# Patient Record
Sex: FEMALE | Race: WHITE | Employment: UNEMPLOYED | URBAN - NONMETROPOLITAN AREA
[De-identification: names, ages, dates, MRNs, and addresses within clinical notes are randomized per-mention and may not be internally consistent; named-entity substitution may affect disease eponyms.]

---

## 2024-01-05 ENCOUNTER — HOSPITAL ENCOUNTER (OUTPATIENT)
Dept: OCCUPATIONAL THERAPY | Age: 24
Setting detail: THERAPIES SERIES
Discharge: HOME | End: 2024-01-05
Payer: MEDICARE

## 2024-01-05 PROCEDURE — 97166 OT EVAL MOD COMPLEX 45 MIN: CPT

## 2024-01-05 PROCEDURE — 97530 THERAPEUTIC ACTIVITIES: CPT

## 2024-01-05 PROCEDURE — 97110 THERAPEUTIC EXERCISES: CPT

## 2024-01-05 NOTE — PLAN OF CARE
Phone: [phone]                      [facility]           Fax: [phone]                           Outpatient Occupational Therapy                                                                            Initial Evaluation      Name:  Lia NATION: ***  Date: 2024  Referring Physician: No ref. provider found   Medical    Rehab Diagnosis/ICD-10#s:***  Insurance: OT Insurance Information: [place] Medicaid  Total # of Visits to Date: 1  Next Dr. Appointment:  Chief Complaint:  CSN #: [id number]    Onset of Injury/Condition:      Mechanism of Injury:    Surgery Date:    Precautions:   []None  [] Fall Risk  []WB Status  [] Pacemaker   []Other:            Involved Extremity:      [] Left [] Right  Dominant: [] Left []Right    Work Status:   [] Normal [] Restricted [] Off D/T Injury/Condition [] Retired [] Unemployed [] Disabled []Other:  Critical Job/Daily Tasks:     Orthosis:     [] Currently has  [] To be custom fabricated  []Planned for subsequent visit  Type:      Subjective:      Pain: Intensity:   /10 Location:     Pain Type: [] Constant [] Intermittent   [  ] with pain meds at rest   [] With movement/Resistive activity [] With Sedentary activity      Objective:    Functional Impairment:  [] Mobility:    [] Current [] Goal [] Discharge  [] Carry: [] Current [] Goal [] Discharge  [] Body Position:  [] Current [] Goal [] Discharge  [] Self Care:    [] Current [] Goal [] Discharge  [] Other:    [] Current [] Goal [] Discharge    Functional Impairment Current: Functional Impairment Goal:   [] 0%(CH)    [] 0%(CH)  [] 1-19%(CI)     [] 1-19%(CI)  [] 20-39%(CJ)    [] 20-39%(CJ)  [] 40-59%(CK)    [] 40-59%(CK)   [] 60-79%(CL)     [] 60-79%(CL)    [] 80-99%(CM)    [] 80-99%(CM)    [] 100%(CN)    [] 100%(CN)  [] NA  [] NA    Functional Impairment Discharge:   [] 0%(CH)     [] 1-19%(CI)      [] 20-39%(CJ)     [] 40-59%(CK)       [] 60-79%(CL)         [] 80-99%(CM)        [] 100%(CN)  [] NA

## 2024-01-12 ENCOUNTER — HOSPITAL ENCOUNTER (OUTPATIENT)
Dept: OCCUPATIONAL THERAPY | Age: 24
Setting detail: THERAPIES SERIES
Discharge: HOME | End: 2024-01-12
Payer: MEDICARE

## 2024-01-12 NOTE — PROGRESS NOTES
Occupational Therapy  [facility]  Inpatient/Observation/Outpatient Rehabilitation    Date: 2024  Patient Name: Lia Marin       [] Inpatient Acute/Observation       [x]  Outpatient  : 2000       [] Pt no showed for scheduled appointment    [] Pt refused/declined therapy at this time due to:           [x] Pt cancelled due to:  [] No Reason Given   [] Sick/ill   [x] Other: No reason given, pt called office to JENNIFER Flores Date: 2024

## 2024-01-19 ENCOUNTER — APPOINTMENT (OUTPATIENT)
Dept: PHYSICAL THERAPY | Age: 24
End: 2024-01-19
Payer: MEDICARE

## 2024-01-19 ENCOUNTER — HOSPITAL ENCOUNTER (OUTPATIENT)
Dept: OCCUPATIONAL THERAPY | Age: 24
Setting detail: THERAPIES SERIES
Discharge: HOME | End: 2024-01-19
Payer: MEDICARE

## 2024-01-19 NOTE — PROGRESS NOTES
Occupational Therapy  [facility]  Inpatient/Observation/Outpatient Rehabilitation    Date: 2024  Patient Name: Lia Marin       [] Inpatient Acute/Observation       [x]  Outpatient  : 2000       [] Pt no showed for scheduled appointment    [] Pt refused/declined therapy at this time due to:           [x] Pt cancelled due to:  [] No Reason Given   [] Sick/ill   [x] Other: Poor weather conditions    Pt called office to Cx appt.       JENNIFER Arias Date: 2024

## 2024-01-26 ENCOUNTER — HOSPITAL ENCOUNTER (OUTPATIENT)
Dept: OCCUPATIONAL THERAPY | Age: 24
Setting detail: THERAPIES SERIES
Discharge: HOME | End: 2024-01-26
Payer: MEDICARE

## 2024-01-26 NOTE — PROGRESS NOTES
Occupational Therapy  [facility]  Inpatient/Observation/Outpatient Rehabilitation    Date: 2024  Patient Name: Lia Marin       [] Inpatient Acute/Observation       [x]  Outpatient  : 2000       [] Pt no showed for scheduled appointment    [] Pt refused/declined therapy at this time due to:           [x] Pt cancelled due to:  [] No Reason Given   [x] Sick/ill   [] Other:    Therapist/Assistant will attempt to see this patient, at our earliest opportunity.       JENNIFER Arias Date: 2024

## 2024-01-31 ENCOUNTER — APPOINTMENT (OUTPATIENT)
Dept: PHYSICAL THERAPY | Age: 24
End: 2024-01-31
Payer: MEDICARE

## 2024-02-02 ENCOUNTER — APPOINTMENT (OUTPATIENT)
Dept: PHYSICAL THERAPY | Age: 24
End: 2024-02-02
Payer: MEDICAID

## 2024-02-02 ENCOUNTER — HOSPITAL ENCOUNTER (OUTPATIENT)
Dept: OCCUPATIONAL THERAPY | Age: 24
Setting detail: THERAPIES SERIES
Discharge: HOME | End: 2024-02-02

## 2024-02-02 NOTE — PROGRESS NOTES
Occupational Therapy  [facility]  Inpatient/Observation/Outpatient Rehabilitation    Date: 2024  Patient Name: Lia Marin       [] Inpatient Acute/Observation       [x]  Outpatient  : 2000       [] Pt no showed for scheduled appointment    [] Pt refused/declined therapy at this time due to:           [x] Pt cancelled due to:  [] No Reason Given   [x] Sick/ill   [] Other:    [] Evaluation held by RN/Provider due to:    [] High Heart Rate   [] High Blood Pressure   [] Orthopedic Consult   [] Hgb < 7   [] Other:      JENNIFER Arias Date: 2024

## 2024-02-07 ENCOUNTER — APPOINTMENT (OUTPATIENT)
Dept: PHYSICAL THERAPY | Age: 24
End: 2024-02-07
Payer: MEDICAID

## 2024-02-09 ENCOUNTER — HOSPITAL ENCOUNTER (OUTPATIENT)
Dept: OCCUPATIONAL THERAPY | Age: 24
Setting detail: THERAPIES SERIES
Discharge: HOME | End: 2024-02-09
Payer: MEDICAID

## 2024-02-09 ENCOUNTER — APPOINTMENT (OUTPATIENT)
Dept: PHYSICAL THERAPY | Age: 24
End: 2024-02-09
Payer: MEDICAID

## 2024-02-09 NOTE — PROGRESS NOTES
[facility]  Inpatient/Observation/Outpatient Rehabilitation    Date: 2024  Patient Name: Lia Marin       [] Inpatient Acute/Observation       [x]  Outpatient  : 2000       [] Pt no showed for scheduled appointment    [] Pt refused/declined therapy at this time due to:           [x] Pt cancelled due to:  [] No Reason Given   [x] Sick/ill   [] Other:      Beatriz Rhoades, OTR/L Date: 2024

## 2024-02-16 ENCOUNTER — HOSPITAL ENCOUNTER (OUTPATIENT)
Dept: OCCUPATIONAL THERAPY | Age: 24
Setting detail: THERAPIES SERIES
Discharge: HOME | End: 2024-02-16
Payer: MEDICAID

## 2024-02-16 PROCEDURE — 97140 MANUAL THERAPY 1/> REGIONS: CPT

## 2024-02-16 PROCEDURE — 97018 PARAFFIN BATH THERAPY: CPT

## 2024-02-16 NOTE — PROGRESS NOTES
[]Improved    HEP  Patient is to be completing/ issued the following handouts/exercises since SOC:             HEP Weight/  Level Reps/Time Comments    Carpal tunnel program x x Tendon glides, putty, median nerve glides                                                    Minutes Tracking:  Time In: 1154  Time Out: 1229  Minutes: 35  Timed Code Treatment Minutes: 33 Minutes  Note: patient late for appt    PLAN  [x]Continue with current plan of care  []Medical “Hold”  []I“Hold” per patient request  [] Change Treatment plan:  [] Insurance hold  __ Other        Electronically signed by NJ Lau/TOVA, CHT   2/16/2024 1:34 PM

## 2024-02-22 ENCOUNTER — HOSPITAL ENCOUNTER (EMERGENCY)
Age: 24
Discharge: LEFT BEFORE DISCHARGE | End: 2024-02-22

## 2024-02-22 VITALS
WEIGHT: 174.16 LBS | OXYGEN SATURATION: 100 % | DIASTOLIC BLOOD PRESSURE: 91 MMHG | TEMPERATURE: 98.2 F | SYSTOLIC BLOOD PRESSURE: 132 MMHG | HEIGHT: 63 IN | RESPIRATION RATE: 20 BRPM | HEART RATE: 72 BPM | BODY MASS INDEX: 30.86 KG/M2

## 2024-02-22 ASSESSMENT — PAIN DESCRIPTION - DESCRIPTORS: DESCRIPTORS: OTHER (COMMENT)

## 2024-02-22 ASSESSMENT — PAIN DESCRIPTION - FREQUENCY: FREQUENCY: INTERMITTENT

## 2024-02-22 ASSESSMENT — PAIN DESCRIPTION - ORIENTATION: ORIENTATION: ANTERIOR

## 2024-02-22 ASSESSMENT — PAIN DESCRIPTION - LOCATION: LOCATION: CHEST

## 2024-02-22 ASSESSMENT — PAIN - FUNCTIONAL ASSESSMENT: PAIN_FUNCTIONAL_ASSESSMENT: 0-10

## 2024-02-22 NOTE — ED NOTES
Patient decided that she \"just want to go home I have my disabled family member to take care of so I'm leaving\"

## 2024-02-23 ENCOUNTER — HOSPITAL ENCOUNTER (OUTPATIENT)
Dept: OCCUPATIONAL THERAPY | Age: 24
Setting detail: THERAPIES SERIES
Discharge: HOME | End: 2024-02-23
Payer: MEDICAID

## 2024-02-23 PROCEDURE — 97140 MANUAL THERAPY 1/> REGIONS: CPT

## 2024-02-23 PROCEDURE — 97018 PARAFFIN BATH THERAPY: CPT

## 2024-02-23 NOTE — PROGRESS NOTES
Occupational Therapy  Phone: [phone]                 [facility]    Fax: [phone]                       Outpatient Occupational Therapy                 DAILY TREATMENT NOTE    Date: 2/23/2024  Patient’s Name:  Lia Marin  YOB: 2000 (23 y.o.)  Gender:  female  MRN:  [id number]  [facility] #: [id number]  Medical      Referring Physician: Isiah Araujo DO     INSURANCE  OT Insurance Information: [place] Medicaid       Total # of Visits to Date: 3       PAIN  []No     [x]Yes      Location: R thumb  Pain Rating (0-10 pain scale): 7/10  Pain Description:     SUBJECTIVE   Pt with nothing new to report            Flow Sheet   Exercise /   Manual treatment Weight/  Level Reps/Time Comments    Joint mobilizations x x R wrist and thumb to improve pain   Cupping   Dynamic over thumb and carpal tunnel R, and static at volar prox forearm                                                                                                                                                                                   Modality Flow Sheet:   START STOP Tx Modality       Electrical Stim:         Ultrasound: __.8_ W/cm2 x __10_ mins  Duty factor: _x_100%  __50%  __20% __10%  Head size:   MHz: __1mHz __2 mHz  _x_3mHz  Location: R dorsal thumb for pain. Patient tolerated well c skin intact pre and post treatment.     10 minutes Hot Pack: R hand with paraffin for pain. Patient tolerated well c skin intact pre and post treatment.     10 minutes Paraffin: R hand with HP  for pain. Patient tolerated well c skin intact pre and post treatment.       Cold Pack:       Dry Needling: ___\" needle to superficial radial, deep radial and  lataeral antebrachial cutaneous at homeostatic neuro- trigger points to...   ___No manipulation/static  ___Basic Manipulation  ___Pistoning Manipulation  ___Needle Rotation  ___Tenting                    GOALS   Time Frame for Long Term Goals : 6 weeks       Long Term Goal 1: Patient to state

## 2024-02-26 ENCOUNTER — HOSPITAL ENCOUNTER (OUTPATIENT)
Age: 24
Discharge: HOME | End: 2024-02-28
Attending: PEDIATRICS
Payer: MEDICAID

## 2024-02-26 DIAGNOSIS — R00.1 BRADYCARDIA: ICD-10-CM

## 2024-02-26 PROCEDURE — 93225 XTRNL ECG REC<48 HRS REC: CPT

## 2024-02-28 LAB
ACQUISITION DURATION: NORMAL S
AVERAGE HEART RATE: 84 BPM
EKG DIAGNOSIS: NORMAL
HOLTER MAX HEART RATE: 135 BPM
HOOKUP DATE: NORMAL
HOOKUP TIME: NORMAL
MAX HEART RATE TIME/DATE: NORMAL
MIN HEART RATE TIME/DATE: NORMAL
MIN HEART RATE: 48 BPM
NUMBER OF QRS COMPLEXES: NORMAL
NUMBER OF SUPRAVENTRICULAR COUPLETS: 0
NUMBER OF SUPRAVENTRICULAR ECTOPICS: 2
NUMBER OF SUPRAVENTRICULAR ISOLATED BEATS: 2
NUMBER OF VENTRICULAR BIGEMINAL CYCLES: 0
NUMBER OF VENTRICULAR COUPLETS: 0
NUMBER OF VENTRICULAR ECTOPICS: 14

## 2024-03-01 ENCOUNTER — HOSPITAL ENCOUNTER (OUTPATIENT)
Dept: OCCUPATIONAL THERAPY | Age: 24
Setting detail: THERAPIES SERIES
Discharge: HOME | End: 2024-03-01

## 2024-03-01 ENCOUNTER — HOSPITAL ENCOUNTER (OUTPATIENT)
Dept: PHYSICAL THERAPY | Age: 24
Setting detail: THERAPIES SERIES
Discharge: HOME | End: 2024-03-01

## 2024-03-01 NOTE — PROGRESS NOTES
Occupational Therapy  [facility]  Inpatient/Observation/Outpatient Rehabilitation    Date: 3/1/2024  Patient Name: Lia Marin       [] Inpatient Acute/Observation       [x]  Outpatient  : 2024  Plan of Care/Recert ends    [] Pt no showed for scheduled appointment    [] Pt refused/declined therapy at this time due to:           [x] Pt cancelled due to:  [] No Reason Given   [] Sick/ill   [x] Other: has to  sister won't be able to make it     [] Evaluation held by RN/Provider due to:    [] High Heart Rate   [] High Blood Pressure   [] Orthopedic Consult   [] Hgb < 7   [] Other:          La RODRIGUEZ/S  Date: 3/1/2024

## 2024-03-08 ENCOUNTER — HOSPITAL ENCOUNTER (OUTPATIENT)
Dept: OCCUPATIONAL THERAPY | Age: 24
Setting detail: THERAPIES SERIES
Discharge: HOME | End: 2024-03-08

## 2024-03-08 NOTE — PROGRESS NOTES
Physical Therapy  [facility]  Inpatient/Observation/Outpatient Rehabilitation    Date: 3/8/2024  Patient Name: Lia Marin       [] Inpatient Acute/Observation       []  Outpatient  : 2000     Plan of Care/Recert ends    [] Pt no showed for scheduled appointment    [] Pt refused/declined therapy at this time due to:           [x] Pt cancelled due to:  [x] No Reason Given   [] Sick/ill   [] Other:    [] Evaluation held by RN/Provider due to:    [] High Heart Rate   [] High Blood Pressure   [] Orthopedic Consult   [] Hgb < 7   [] Other:    Therapist/Assistant will attempt to see this patient, at our earliest opportunity.       Billie Arredondo Date: 3/8/2024

## 2024-03-08 NOTE — PROGRESS NOTES
[facility]  Inpatient/Observation/Outpatient Rehabilitation    Date: 3/8/2024  Patient Name: Lia Marin       [] Inpatient Acute/Observation       [x]  Outpatient  : 2000         [] Pt no showed for scheduled appointment    [] Pt refused/declined therapy at this time due to:           [x] Pt cancelled due to:  [x] No Reason Given   [] Sick/ill   [] Other:        Beatriz Rhoades, OTR/L Date: 3/8/2024

## 2024-03-15 ENCOUNTER — HOSPITAL ENCOUNTER (OUTPATIENT)
Dept: OCCUPATIONAL THERAPY | Age: 24
Setting detail: THERAPIES SERIES
Discharge: HOME | End: 2024-03-15
Payer: MEDICAID

## 2024-03-15 NOTE — PROGRESS NOTES
[facility]  Inpatient/Observation/Outpatient Rehabilitation    Date: 3/15/2024  Patient Name: Lia Marin       [] Inpatient Acute/Observation       [x]  Outpatient  : 2000         [] Pt no showed for scheduled appointment    [] Pt refused/declined therapy at this time due to:           [x] Pt cancelled due to:  [] No Reason Given   [x] Sick/ill   [] Other:      Beatriz Rhoades, OTR/L Date: 3/15/2024

## 2024-03-22 ENCOUNTER — HOSPITAL ENCOUNTER (OUTPATIENT)
Dept: OCCUPATIONAL THERAPY | Age: 24
Setting detail: THERAPIES SERIES
Discharge: HOME | End: 2024-03-22
Payer: MEDICAID

## 2024-03-22 ENCOUNTER — HOSPITAL ENCOUNTER (OUTPATIENT)
Dept: PHYSICAL THERAPY | Age: 24
Setting detail: THERAPIES SERIES
Discharge: HOME | End: 2024-03-22
Payer: MEDICAID

## 2024-03-22 PROCEDURE — 97140 MANUAL THERAPY 1/> REGIONS: CPT

## 2024-03-22 PROCEDURE — 97110 THERAPEUTIC EXERCISES: CPT

## 2024-03-22 PROCEDURE — 97018 PARAFFIN BATH THERAPY: CPT

## 2024-03-22 PROCEDURE — 97035 APP MDLTY 1+ULTRASOUND EA 15: CPT

## 2024-03-22 NOTE — PROGRESS NOTES
Occupational Therapy  Phone: [phone]                 [facility]    Fax: [phone]                       Outpatient Occupational Therapy                 DAILY TREATMENT NOTE    Date: 3/22/2024  Patient’s Name:  Lia Marin  YOB: 2000 (23 y.o.)  Gender:  female  MRN:  [id number]  [facility] #: [id number]  Medical      Referring Physician: No ref. provider found     INSURANCE  OT Insurance Information: [place] Medicaid       Total # of Visits to Date: 4       PAIN  []No     [x]Yes      Location: R wrist and thumb area  Pain Rating (0-10 pain scale): 4-5/10  Pain Description:     SUBJECTIVE   Pt reports she has not been doing the HEPs.     Flow Sheet   Exercise /   Manual treatment Weight/  Level Reps/Time Comments    Joint mobilizations x x R wrist and thumb to improve pain   Cupping  x x  Dynamic over thumb and carpal tunnel R, and static at volar prox forearm                                                                                                                                                                                                        Modality Flow Sheet:   START STOP Tx Modality       Electrical Stim:         8' Ultrasound: __.8_ W/cm2 x __10_ mins  Duty factor: _x_100%  __50%  __20% __10%  Head size:   MHz: __1mHz __2 mHz  _x_3mHz  Location: R dorsal thumb for pain. Patient tolerated well c skin intact pre and post treatment.     10 minutes Hot Pack: R hand with paraffin for pain. Patient tolerated well c skin intact pre and post treatment.     10 minutes Paraffin: R hand with HP  for pain. Patient tolerated well c skin intact pre and post treatment.       Cold Pack:       Dry Needling: ___\" needle to superficial radial, deep radial and  lataeral antebrachial cutaneous at homeostatic neuro- trigger points to...   ___No manipulation/static  ___Basic Manipulation  ___Pistoning Manipulation  ___Needle Rotation  ___Tenting                       GOALS   Time Frame for Long Term

## 2024-03-22 NOTE — PROGRESS NOTES
Phone: [phone]                 [facility]           Fax: [phone]                           Outpatient Physical Therapy                                                                            Daily Note    Patient: Lia Marin : 2000  CSN #: [id number]   Referring Physician: No ref. provider found  Date: 3/22/2024    Diagnosis: Lumbago  Treatment Diagnosis: Low back pain    PT Insurance Information: [place] Medicaid [place]  Total # of Visits Approved: 12 Per Physician Order  Total # of Visits to Date: 2  No Show: 0  Canceled Appointment: 0    24 Plan of Care/Recert Due    Pre-Treatment Pain:  2/10  Subjective: Pt arrives with mild pain in the low back. Reports she has been good about doing one of her exercises but hasn't been good about completing the full HEP.    Exercises:  Exercise 2: SciFit Level 1 10 minutes  Exercise 3: Posterior pelvic tilt 10 x 10 second hold , bridge 3 x 10 with pelvic tilt  Exercise 4: 90-90 heel tap 2 x 5  Exercise 5: Quadruped hip hike 2 x 10 bilateral  Exercise 6: quadruped pull through 10 pounds 2 x 10 bilateral       Modality:     Modality Flow Sheet:   Performed (X) Tx Modality   X Hot Pack: 10 minutes - heat pack to low back for reduction in soreness          Assessment  Body Structures, Functions, Activity Limitations Requiring Skilled Therapeutic Intervention: Decreased functional mobility , Decreased strength, Decreased posture, Increased pain, Decreased tolerance to work activity, Decreased body mechanics  Assessment: Pt arrived with mild pain in the low back. Therapist reviewed HEP and provided as needed for form. Pt continues to demonstrate reduced core strength and control. Therapist provided visual demonstration for new exercises with education on form. Pt able to complete without increased pain. Ended session with use of heat pack. Will continue to progress with core stability and strengthening as needed for improved participation with daily

## 2024-04-05 ENCOUNTER — HOSPITAL ENCOUNTER (OUTPATIENT)
Dept: OCCUPATIONAL THERAPY | Age: 24
Setting detail: THERAPIES SERIES
Discharge: HOME | End: 2024-04-05

## 2024-04-05 NOTE — PROGRESS NOTES
Occupational Therapy  [facility]  Inpatient/Observation/Outpatient Rehabilitation    Date: 2024  Patient Name: Lia Marin       [] Inpatient Acute/Observation       [x]  Outpatient  : 2000       [] Pt no showed for scheduled appointment    [] Pt refused/declined therapy at this time due to:           [x] Pt cancelled due to:  [] No Reason Given   [] Sick/ill   [x] Other: Pt called office to report hot water heater going out and landlord coming to fix    [] Evaluation held by RN/Provider due to:    [] High Heart Rate   [] High Blood Pressure   [] Orthopedic Consult   [] Hgb < 7   [] Other:    [] Pt does not require skilled services due to:        JENNIFER Arias Date: 2024

## 2024-04-10 ENCOUNTER — HOSPITAL ENCOUNTER (OUTPATIENT)
Dept: OCCUPATIONAL THERAPY | Age: 24
Setting detail: THERAPIES SERIES
Discharge: HOME | End: 2024-04-10

## 2024-04-10 NOTE — PROGRESS NOTES
[facility]  Inpatient/Observation/Outpatient Rehabilitation    Date: 4/10/2024  Patient Name: Lia Marin       [] Inpatient Acute/Observation       [x]  Outpatient  : 2000       [] Pt no showed for scheduled appointment    [] Pt refused/declined therapy at this time due to:           [x] Pt cancelled due to:  [x] No Reason Given   [] Sick/ill   [] Other:          Beatriz Rhoades OTR/L Date: 4/10/2024

## 2024-04-18 ENCOUNTER — HOSPITAL ENCOUNTER (OUTPATIENT)
Dept: OCCUPATIONAL THERAPY | Age: 24
Setting detail: THERAPIES SERIES
Discharge: HOME | End: 2024-04-18

## 2024-04-18 NOTE — PROGRESS NOTES
[facility]  Inpatient/Observation/Outpatient Rehabilitation    Date: 2024  Patient Name: Lia Marin       [] Inpatient Acute/Observation       []  Outpatient  : 2000         [] Pt no showed for scheduled appointment    [] Pt refused/declined therapy at this time due to:           [x] Pt cancelled due to:  [] No Reason Given   [] Sick/ill   [x] Other: family emergency        Beatriz Rhoades OTR/TOVA Date: 2024

## 2024-04-19 ENCOUNTER — APPOINTMENT (OUTPATIENT)
Dept: PHYSICAL THERAPY | Age: 24
End: 2024-04-19
Payer: MEDICAID

## 2024-04-19 ENCOUNTER — APPOINTMENT (OUTPATIENT)
Dept: OCCUPATIONAL THERAPY | Age: 24
End: 2024-04-19
Payer: MEDICAID

## 2024-04-26 ENCOUNTER — HOSPITAL ENCOUNTER (OUTPATIENT)
Dept: OCCUPATIONAL THERAPY | Age: 24
Setting detail: THERAPIES SERIES
Discharge: HOME | End: 2024-04-26
Payer: MEDICAID

## 2024-04-26 ENCOUNTER — HOSPITAL ENCOUNTER (OUTPATIENT)
Dept: PHYSICAL THERAPY | Age: 24
Setting detail: THERAPIES SERIES
Discharge: HOME | End: 2024-04-26
Payer: MEDICAID

## 2024-04-26 PROCEDURE — 97110 THERAPEUTIC EXERCISES: CPT

## 2024-04-26 NOTE — PROGRESS NOTES
Occupational Therapy  [facility]  Inpatient/Observation/Outpatient Rehabilitation    Date: 2024  Patient Name: Lia Marin       [] Inpatient Acute/Observation       [x]  Outpatient  : 2000       [] Pt no showed for scheduled appointment    [] Pt refused/declined therapy at this time due to:           [x] Pt cancelled due to:  [] No Reason Given   [] Sick/ill   [x] Other: Pt is in building with sister in PT, \"I have to cx OT, I have to have Lianna back to group home by 2:30p.\"    [] Evaluation held by RN/Provider due to:    [] High Heart Rate   [] High Blood Pressure   [] Orthopedic Consult   [] Hgb < 7   [] Other:    [] Pt does not require skilled services due to:        JENNIFER Arias Date: 2024

## 2024-04-26 NOTE — PROGRESS NOTES
Phone: [phone]                 [facility]           Fax: [phone]                           Outpatient Physical Therapy                                                                            Daily Note    Patient: Lia Marin : 2000  CSN #: [id number]   Referring Physician: No ref. provider found  Date: 2024    Diagnosis: Lumbago  Treatment Diagnosis: Low back pain    PT Insurance Information: [place] Medicaid [place]  Total # of Visits Approved: 12 Per Physician Order  Total # of Visits to Date: 3  No Show: 0  Canceled Appointment: 0    24 Plan of Care/Recert Due    Pre-Treatment Pain:  6/10  Subjective: Pt arrives with reports of increased sciatic type pain in the left side of her low back 6/10    Exercises:  Exercise 3: Posterior pelvic tilt 10 x 10 second hold , bridge 3 x 10 with pelvic tilt  Exercise 4: 90-90 heel tap 2 x 5  Exercise 5: piriformis stretch 2 x 10 on stability ball  Exercise 7: Bird dogs 1 x 10 bilateral  Exercise 8: Bent knee fall out 2 x 10 bilateral  Exercise 9: 90-90 hamstring 1 x 10 bilateral    Assessment  Assessment: Pt arrives with moderate pain in low back and mid back. Pt and therapist discussed compliance with HEP and reviewed pain with functional tasks. Thearpist directed pt through treatment in order to facilitate postural strength and core stability with functional tasks. Pt continues to display reduced core strength and control. Therapist provided verbal cuing for form with activities. Progressed pt with hip mobility exercises as needed for pain management with good relief. Pt with left sided radicular symptoms and restriction noted during sciatic nerve glides more than the right side. Pt to benefit from continued skilled outpatient physical therapy in order to facilitate body mechanics, core stability and pain management as needed for improved functional mobility and quality of life.    Objective measures    Strength Lower extremities   Right

## 2024-04-27 NOTE — PLAN OF CARE
[facility]           Phone: [phone]             Outpatient Physical Therapy  Fax: [phone]                                           Date: 2024  Patient: Lia Marin : 2000 CSN #: [id number]   Referring Physician: No ref. provider found      [] Plan of Care   [x] Updated Plan of Care    Dates of Service to Include: 2024 to 24    Diagnosis:  Lumbago     Rehab (Treatment) Diagnosis:  Low back pain         Attendance  Total # of Visits to Date: 3 No Show: 0 Canceled Appointment: 0    Assessment  Assessment: Pt arrives with moderate pain in low back and mid back. Pt and therapist discussed compliance with HEP and reviewed pain with functional tasks. Thearpist directed pt through treatment in order to facilitate postural strength and core stability with functional tasks. Pt continues to display reduced core strength and control. Therapist provided verbal cuing for form with activities. Progressed pt with hip mobility exercises as needed for pain management with good relief. Pt with left sided radicular symptoms and restriction noted during sciatic nerve glides more than the right side. Pt to benefit from continued skilled outpatient physical therapy in order to facilitate body mechanics, core stability and pain management as needed for improved functional mobility and quality of life.    Objective measures     Strength Lower extremities   Right hip flexion: 5/5  Right hip abduction: 4+/5  Right knee extension: 5/5  Right knee flexion: 5/5     Left hip flexion: 4+/5  Left hip abduction: 4-/5  Left knee extension: 5-/5  Left knee flexion: 5/5     Core strength = fair -      Straight leg raise:  Right = 70 degrees  Left = 60 degrees      Oswestry = 17     Goals  Short Term Goals  Time Frame for Short Term Goals: 3 weeks  Short Term Goal 1: Pt will be educated on and initiate HEP for core

## 2024-05-09 ENCOUNTER — HOSPITAL ENCOUNTER (OUTPATIENT)
Dept: PHYSICAL THERAPY | Age: 24
Setting detail: THERAPIES SERIES
Discharge: HOME | End: 2024-05-09
Payer: MEDICAID

## 2024-05-09 PROCEDURE — 97110 THERAPEUTIC EXERCISES: CPT

## 2024-05-10 NOTE — PROGRESS NOTES
Phone: [phone]                 [facility]           Fax: [phone]                           Outpatient Physical Therapy                                                                            Daily Note    Patient: Lia Marin : 2000  CSN #: [id number]   Referring Physician: No ref. provider found  Date: 2024    Diagnosis: Lumbago  Treatment Diagnosis: Low back pain    PT Insurance Information: [place] Medicaid [place]  Total # of Visits Approved: 12 Per Physician Order  Total # of Visits to Date: 4  No Show: 0  Canceled Appointment: 0    24 Plan of Care/Recert Due    Pre-Treatment Pain:  5/10  Subjective: Pt arrives with reports of 5/10 pain. Pt states she woke up with increased pain.    Exercises:  Exercise 3: Posterior pelvic tilt 10 x 10 second hold , bridge 3 x 10 with pelvic tilt yellow theraband  Exercise 5: piriformis stretch 2 x 10 on stability ball  Exercise 6: quadruped pull through 10 pounds 2 x 10 bilateral  Exercise 8: Bent knee fall out 2 x 10 bilateral  Exercise 9: 90-90 hamstring 1 x 10 bilateral  Exercise 10: low trunk rotation feet on table 3 x 10 , feet on stability ball 2 x 10  , double knee to chest 3 x 10      Assessment  Body Structures, Functions, Activity Limitations Requiring Skilled Therapeutic Intervention: Decreased functional mobility , Decreased strength, Decreased posture, Increased pain, Decreased tolerance to work activity, Decreased body mechanics  Assessment: Pt arrives with moderate pain in the low back. Therapist directed pt through exercises per activity log in order to facilitate core stabilization. Pt tolerated well. Improved core activation with pelvic tilts this date and pt able to progress with increased resistance. Therapist and pt discussed plan for discharge with pt agreeable to plan of care. Pt ended session with mild reduction in pain.    Activity Tolerance  Activity Tolerance: Patient tolerated treatment well    Patient

## 2024-05-16 ENCOUNTER — HOSPITAL ENCOUNTER (OUTPATIENT)
Dept: PHYSICAL THERAPY | Age: 24
Setting detail: THERAPIES SERIES
Discharge: HOME | End: 2024-05-16
Payer: MEDICAID

## 2024-05-16 NOTE — PROGRESS NOTES
Physical Therapy  [facility]  Inpatient/Observation/Outpatient Rehabilitation    Date: 2024  Patient Name: Lia Marin       [] Inpatient Acute/Observation       [x]  Outpatient  : 2000     Plan of Care/Recert ends    [x] Pt no showed for scheduled appointment    [] Pt refused/declined therapy at this time due to:           [] Pt cancelled due to:  [] No Reason Given   [] Sick/ill   [] Other:    [] Evaluation held by RN/Provider due to:    [] High Heart Rate   [] High Blood Pressure   [] Orthopedic Consult   [] Hgb < 7   [] Other:    [] Pt does not require skilled services due to:      Therapist/Assistant will attempt to see this patient, at our earliest opportunity.       Xiomara Metcalf Date: 2024

## 2024-05-29 ENCOUNTER — HOSPITAL ENCOUNTER (OUTPATIENT)
Dept: PHYSICAL THERAPY | Age: 24
Setting detail: THERAPIES SERIES
Discharge: HOME | End: 2024-05-29
Payer: MEDICAID

## 2024-05-29 PROCEDURE — 97110 THERAPEUTIC EXERCISES: CPT

## 2024-05-29 NOTE — PROGRESS NOTES
Phone: [phone]                 [facility]           Fax: [phone]                           Outpatient Physical Therapy                                                                            Daily Note    Patient: Lia Marin : 2000  CSN #: [id number]   Referring Physician: No ref. provider found  Date: 2024    Diagnosis: Lumbago  Treatment Diagnosis: Low back pain    PT Insurance Information: [employer] [place]  Total # of Visits Approved: 12 Per Physician Order  Total # of Visits to Date: 5  No Show: 0  Canceled Appointment: 0    24 Plan of Care/Recert Due    Pre-Treatment Pain:  0/10  Subjective: Pt arrives without report of pain in low back. Stating she is beginning to have upper back pain and believes it is related to posture.    Exercises:  Exercise 3: Posterior pelvic tilt 10 x 10 second hold , bridge 3 x 10 with pelvic tilt yellow theraband  Exercise 4: 90-90 heel tap 2 x 5  Exercise 5: piriformis stretch 2 x 10 on stability ball  Exercise 6: quadruped pull through 10 pounds 2 x 10 bilateral  Exercise 8: Bent knee fall out 2 x 10 bilateral  Exercise 9: 90-90 hamstring 1 x 10 bilateral  Exercise 10: low trunk rotation feet on table 3 x 10 , feet on stability ball 2 x 10  , double knee to chest 3 x 10      Assessment  Body Structures, Functions, Activity Limitations Requiring Skilled Therapeutic Intervention: Decreased functional mobility , Decreased strength, Decreased posture, Increased pain, Decreased tolerance to work activity, Decreased body mechanics  Assessment: Pt has completed 5 sessions of skilled outpatient physical therapy for complaints of low back pain. Pt made good progress towards goals with good improvement of core stabilization and control. Therapist and pt discussed HEP with packet provided. Therapist encouraged pt to continue with a home strengthening program to continue to facilitate stability of the trunk as needed for daily tasks. Pt to be

## 2024-05-29 NOTE — DISCHARGE SUMMARY
Phone: [phone]                 [facility]          Fax: [phone]                            Outpatient Physical Therapy                                                                    Discharge Summary    Patient: Lia Marin  : 2000  CSN #: [id number]   Referring Physician: No ref. provider found  Diagnosis: Lumbago  Treatment Diagnosis: Low back pain      Date Treatment Initiated: 3/1/2024  Date of Last Treatment: 2024      PT Visit Information  PT Insurance Information: [employer] [place]  Total # of Visits Approved: 12  Total # of Visits to Date: 5  Plan of Care/Certification Expiration Date: 24  No Show: 0  Canceled Appointment: 0  Referring Provider (secondary): Dr. Araujo      Frequency/Duration  Plan Frequency: 1x per week times per week  Plan weeks: 6 weeks weeks      Treatment Received  [] HP/CP      [] Electrical Stim   [x] Therapeutic Exercise      [] Gait Training  [] Aquatics   [] Ultrasound         [x] Patient Education/HEP   [] Manual Therapy  [] Traction    [] Neuro-xiang        [] Soft Tissue Mobs            [] Therapeutic Act  [] Iontophoresis    [] Orthotic casting/fitting      [] Dry Needling    Assessment  Assessment: Pt has completed 5 sessions of skilled outpatient physical therapy for complaints of low back pain. Pt made good progress towards goals with good improvement of core stabilization and control. Therapist and pt discussed HEP with packet provided. Therapist encouraged pt to continue with a home strengthening program to continue to facilitate stability of the trunk as needed for daily tasks. Pt to be discharged at this time from skilled outpatient physical therapy to [facility]. Pt agreeable.     Objective measures     Oswestry = 8     Lower extremity strength  Right hip flexion = 5-/5  Left hip flexion = 5-/5  Right hip abduction = 5-/5  Left hip abduction = 4+/5  Right knee flexion = 5/5  Left knee flexion= 5/5  Right knee extension = 5/5  Left

## 2025-04-07 ENCOUNTER — OFFICE VISIT (OUTPATIENT)
Age: 25
End: 2025-04-07

## 2025-04-07 VITALS
BODY MASS INDEX: 30.3 KG/M2 | HEIGHT: 63 IN | TEMPERATURE: 98 F | OXYGEN SATURATION: 98 % | HEART RATE: 76 BPM | RESPIRATION RATE: 18 BRPM | WEIGHT: 171 LBS

## 2025-04-07 DIAGNOSIS — J06.9 VIRAL UPPER RESPIRATORY ILLNESS: ICD-10-CM

## 2025-04-07 DIAGNOSIS — H65.92 LEFT NON-SUPPURATIVE OTITIS MEDIA: Primary | ICD-10-CM

## 2025-04-07 RX ORDER — METOPROLOL TARTRATE 25 MG/1
25 TABLET, FILM COATED ORAL 2 TIMES DAILY
COMMUNITY

## 2025-04-07 RX ORDER — AZITHROMYCIN 250 MG/1
TABLET, FILM COATED ORAL
Qty: 6 TABLET | Refills: 0 | Status: SHIPPED | OUTPATIENT
Start: 2025-04-07 | End: 2025-04-17

## 2025-04-07 ASSESSMENT — ENCOUNTER SYMPTOMS
ABDOMINAL PAIN: 0
DIARRHEA: 0
NAUSEA: 0
RHINORRHEA: 1
COUGH: 1